# Patient Record
Sex: MALE | NOT HISPANIC OR LATINO
[De-identification: names, ages, dates, MRNs, and addresses within clinical notes are randomized per-mention and may not be internally consistent; named-entity substitution may affect disease eponyms.]

---
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
## 2024-07-02 ENCOUNTER — WALK IN (OUTPATIENT)
Dept: URGENT CARE | Age: 19
End: 2024-07-02
Attending: EMERGENCY MEDICINE

## 2024-07-02 VITALS
TEMPERATURE: 98.1 F | SYSTOLIC BLOOD PRESSURE: 122 MMHG | OXYGEN SATURATION: 99 % | RESPIRATION RATE: 16 BRPM | HEART RATE: 82 BPM | DIASTOLIC BLOOD PRESSURE: 85 MMHG | WEIGHT: 195 LBS

## 2024-07-02 DIAGNOSIS — S39.012A STRAIN OF LUMBAR REGION, INITIAL ENCOUNTER: Primary | ICD-10-CM

## 2024-07-02 RX ORDER — MESALAMINE 1.2 G/1
TABLET, DELAYED RELEASE ORAL
COMMUNITY
End: 2024-07-02 | Stop reason: CLARIF

## 2024-07-02 RX ORDER — MELOXICAM 7.5 MG/1
7.5 TABLET ORAL DAILY
Qty: 7 TABLET | Refills: 0 | Status: SHIPPED | OUTPATIENT
Start: 2024-07-02 | End: 2024-07-09

## 2025-05-19 ENCOUNTER — HOSPITAL ENCOUNTER (OUTPATIENT)
Age: 20
Discharge: HOME | End: 2025-05-19
Payer: COMMERCIAL

## 2025-05-19 VITALS
OXYGEN SATURATION: 97 % | TEMPERATURE: 98 F | HEART RATE: 88 BPM | RESPIRATION RATE: 20 BRPM | SYSTOLIC BLOOD PRESSURE: 111 MMHG | DIASTOLIC BLOOD PRESSURE: 80 MMHG

## 2025-05-19 DIAGNOSIS — J03.90 ACUTE TONSILLITIS, UNSPECIFIED ETIOLOGY: Primary | ICD-10-CM

## 2025-05-19 LAB — S PYO AG THROAT QL: NEGATIVE

## 2025-05-19 PROCEDURE — 87880 STREP A ASSAY W/OPTIC: CPT | Performed by: NURSE PRACTITIONER

## 2025-05-19 PROCEDURE — 99214 OFFICE O/P EST MOD 30 MIN: CPT | Performed by: NURSE PRACTITIONER

## 2025-05-19 RX ORDER — AMOXICILLIN 875 MG/1
875 TABLET, COATED ORAL 2 TIMES DAILY
Qty: 20 TABLET | Refills: 0 | Status: SHIPPED | OUTPATIENT
Start: 2025-05-19 | End: 2025-05-21 | Stop reason: ALTCHOICE

## 2025-05-19 NOTE — ED PROVIDER NOTES
Patient Seen in: [facility]mediate [facility]      History     Chief Complaint   Patient presents with    Cough/URI     Stated Complaint: sore throat, runny nose    Subjective:   HPI  History of Present Illness              19-year-old male presents with sore throat painful swallowing mild runny nose for the last 2 days.  No vomiting.  No fever.  No rash.    Objective:     Past Medical History:    Abdominal pain    Belching    Blood in the stool    Bronchitis    Colitis    ulcerative    Constipation    Diarrhea, unspecified    Esophageal reflux    Flatulence/gas pain/belching    Heartburn    Irregular bowel habits    Problems with swallowing    Sleep disturbance    Strep throat    Ulcerative colitis (HCC)    Visual impairment    glasses for reading    Weight loss              Past Surgical History:   Procedure Laterality Date    Colonoscopy N/A 7/10/2019    Procedure: COLONOSCOPY;  Surgeon: Bienvenido Koroma MD;  Location:  ENDOSCOPY    Colonoscopy N/A 6/10/2020    Procedure: COLONOSCOPY;  Surgeon: Bienvenido Koroma MD;  Location:  ENDOSCOPY    Egd      Myringoplasty Bilateral     Other Left 01/2018    wrist surgery    Other surgical history      tubes to ears                Social History     Socioeconomic History    Marital status: Single   Tobacco Use    Smoking status: Never    Smokeless tobacco: Never   Vaping Use    Vaping status: Never Used   Substance and Sexual Activity    Alcohol use: Never     Alcohol/week: 0.0 standard drinks of alcohol    Drug use: Never              Review of Systems    Positive for stated complaint: sore throat, runny nose, cough  Other systems are as noted in HPI.  Constitutional and vital signs reviewed.      All other systems reviewed and negative except as noted above.                  Physical Exam     ED Triage Vitals [05/19/25 1030]   /80   Pulse 88   Resp 20   Temp 98 °F (36.7 °C)   Temp src Oral   SpO2 97 %   O2 Device None (Room air)       Current Vitals:   Vital  Signs  BP: 111/80  Pulse: 88  Resp: 20  Temp: 98 °F (36.7 °C)  Temp src: Oral    Oxygen Therapy  SpO2: 97 %  O2 Device: None (Room air)          Physical Exam  Vitals and nursing note reviewed.   Constitutional:       Appearance: Normal appearance. He is ill-appearing.   HENT:      Right Ear: Tympanic membrane normal.      Left Ear: Tympanic membrane normal.      Nose: Rhinorrhea present.      Mouth/Throat:      Pharynx: Oropharyngeal exudate (Diffuse) and posterior oropharyngeal erythema present.      Tonsils: Tonsillar exudate present. No tonsillar abscesses.      Comments: Scattered exudate to the posterior pharynx uvula  Neck:      Comments: There is no posterior lymphadenopathy appreciated left anterior  Cardiovascular:      Rate and Rhythm: Normal rate and regular rhythm.      Pulses: Normal pulses.      Heart sounds: Normal heart sounds.   Musculoskeletal:      Cervical back: Normal range of motion and neck supple. No rigidity or tenderness.   Neurological:      Mental Status: He is alert.                   ED Course     Labs Reviewed   POCT RAPID STREP - Normal          Results                                MDM      19-year-old male presents with painful swallowing sore throat x 2 days strep considered as well as URI PTA COVID there is diffuse exudate and swelling will treat for tonsillitis strep was negative culture is pending  Amoxil twice daily x 10 days close PMD follow-up strict return precautions were discussed patient will add probiotic        Medical Decision Making  Problems Addressed:  Acute tonsillitis, unspecified etiology: acute illness or injury with systemic symptoms that poses a threat to life or bodily functions    Risk  OTC drugs.  Prescription drug management.          Disposition and Plan     Clinical Impression:  1. Acute tonsillitis, unspecified etiology         Disposition:  Discharge  5/19/2025 11:26 am    Follow-up:  Dionicio Almazan MD  [address]  [facility] [address]  [facility]  [address]  [phone]    Schedule an appointment as soon as possible for a visit   If symptoms worsen          Medications Prescribed:  Discharge Medication List as of 5/19/2025 11:26 AM        START taking these medications    Details   amoxicillin 875 MG Oral Tab Take 1 tablet (875 mg total) by mouth 2 (two) times daily for 10 days., Normal, Disp-20 tablet, R-0                   Supplementary Documentation:

## 2025-05-19 NOTE — DISCHARGE INSTRUCTIONS
Tylenol/ibuprofen as needed for pain and fever  Supportive care: Run humidifier, increase fluids, elevate head of bed  Ibuprofen  frequent nasal clearing, saline spray/steam showers.   Follow-up with your doctor as needed return for any new or worsening symptoms at any time

## 2025-05-21 ENCOUNTER — HOSPITAL ENCOUNTER (OUTPATIENT)
Age: 20
Discharge: HOME | End: 2025-05-21
Payer: COMMERCIAL

## 2025-05-21 VITALS
RESPIRATION RATE: 22 BRPM | TEMPERATURE: 99 F | SYSTOLIC BLOOD PRESSURE: 136 MMHG | DIASTOLIC BLOOD PRESSURE: 75 MMHG | OXYGEN SATURATION: 97 % | HEART RATE: 88 BPM

## 2025-05-21 DIAGNOSIS — J02.9 ACUTE SORE THROAT: Primary | ICD-10-CM

## 2025-05-21 LAB — POCT MONO: NEGATIVE

## 2025-05-21 PROCEDURE — 87591 N.GONORRHOEAE DNA AMP PROB: CPT | Performed by: NURSE PRACTITIONER

## 2025-05-21 PROCEDURE — 86308 HETEROPHILE ANTIBODY SCREEN: CPT | Performed by: NURSE PRACTITIONER

## 2025-05-21 PROCEDURE — 87491 CHLMYD TRACH DNA AMP PROBE: CPT | Performed by: NURSE PRACTITIONER

## 2025-05-21 PROCEDURE — 99214 OFFICE O/P EST MOD 30 MIN: CPT | Performed by: NURSE PRACTITIONER

## 2025-05-21 RX ORDER — DEXAMETHASONE SODIUM PHOSPHATE 10 MG/ML
10 INJECTION, SOLUTION INTRAMUSCULAR; INTRAVENOUS ONCE
Status: COMPLETED | OUTPATIENT
Start: 2025-05-21 | End: 2025-05-21

## 2025-05-21 NOTE — DISCHARGE INSTRUCTIONS
Stop amoxicillin, start Augmentin.  Will contact you if throat swab is positive.  Follow closely with your primary.  ER precautions as discussed.

## 2025-05-21 NOTE — ED PROVIDER NOTES
Patient Seen in: [facility]mediate [facility]        History  No chief complaint on file.    Stated Complaint: Sore throat    Subjective:   This is a 19-year-old male with history of ulcerative colitis.  Presents to immediate care for continued sore throat.  He was seen here 2 days ago and placed on a course of amoxicillin for presumptive strep positive tonsillitis.  Reports symptoms have not improved.  No voice change or stridor.  No fevers.  States he does feel fatigued.  Taking amoxicillin as prescribed.  No fevers.  No other treatment attempted prior to arrival.     The history is provided by the patient.                     Objective:     Past Medical History:    Abdominal pain    Belching    Blood in the stool    Bronchitis    Colitis    ulcerative    Constipation    Diarrhea, unspecified    Esophageal reflux    Flatulence/gas pain/belching    Heartburn    Irregular bowel habits    Problems with swallowing    Sleep disturbance    Strep throat    Ulcerative colitis (HCC)    Visual impairment    glasses for reading    Weight loss              Past Surgical History:   Procedure Laterality Date    Colonoscopy N/A 7/10/2019    Procedure: COLONOSCOPY;  Surgeon: Bienvenido Koroma MD;  Location:  ENDOSCOPY    Colonoscopy N/A 6/10/2020    Procedure: COLONOSCOPY;  Surgeon: Bienvenido Koroma MD;  Location:  ENDOSCOPY    Egd      Myringoplasty Bilateral     Other Left 01/2018    wrist surgery    Other surgical history      tubes to ears                Social History     Socioeconomic History    Marital status: Single   Tobacco Use    Smoking status: Never    Smokeless tobacco: Never   Vaping Use    Vaping status: Never Used   Substance and Sexual Activity    Alcohol use: Never     Alcohol/week: 0.0 standard drinks of alcohol    Drug use: Never              Review of Systems   Constitutional:  Negative for chills and fever.   HENT:  Positive for sore throat and trouble swallowing. Negative for congestion, ear  discharge, ear pain and voice change.    Respiratory:  Negative for cough, shortness of breath and wheezing.    Cardiovascular:  Negative for chest pain, palpitations and leg swelling.   Gastrointestinal:  Negative for abdominal pain, constipation, diarrhea, nausea and vomiting.   Genitourinary:  Negative for dysuria.   Musculoskeletal:  Negative for back pain, neck pain and neck stiffness.   Skin:  Negative for rash.   Neurological:  Negative for headaches.       Positive for stated complaint: Sore throat  Other systems are as noted in HPI.  Constitutional and vital signs reviewed.      All other systems reviewed and negative except as noted above.                  Physical Exam    ED Triage Vitals [05/21/25 1206]   /75   Pulse 88   Resp 22   Temp 98.5 °F (36.9 °C)   Temp src Oral   SpO2 97 %   O2 Device None (Room air)       Current Vitals:   Vital Signs  BP: 136/75  Pulse: 88  Resp: 22  Temp: 98.5 °F (36.9 °C)  Temp src: Oral    Oxygen Therapy  SpO2: 97 %  O2 Device: None (Room air)            Physical Exam  Vitals and nursing note reviewed.   Constitutional:       General: He is not in acute distress.     Appearance: Normal appearance. He is not ill-appearing, toxic-appearing or diaphoretic.   HENT:      Head: Normocephalic and atraumatic.      Right Ear: External ear normal.      Left Ear: External ear normal.      Nose: Nose normal.      Mouth/Throat:      Mouth: Mucous membranes are moist.      Pharynx: Uvula midline. Oropharyngeal exudate and posterior oropharyngeal erythema present. No pharyngeal swelling, uvula swelling or postnasal drip.      Tonsils: Tonsillar exudate present. No tonsillar abscesses. 1+ on the right. 1+ on the left.   Eyes:      General:         Right eye: No discharge.         Left eye: No discharge.      Extraocular Movements: Extraocular movements intact.      Conjunctiva/sclera: Conjunctivae normal.   Cardiovascular:      Rate and Rhythm: Normal rate.   Pulmonary:      Effort:  Pulmonary effort is normal.   Musculoskeletal:      Cervical back: Neck supple.      Right lower leg: No edema.      Left lower leg: No edema.   Skin:     General: Skin is warm and dry.      Capillary Refill: Capillary refill takes less than 2 seconds.      Findings: No rash.   Neurological:      Mental Status: He is alert and oriented to person, place, and time.   Psychiatric:         Mood and Affect: Mood normal.         Behavior: Behavior normal.                 ED Course  Labs Reviewed   POCT MONO TEST - Normal   CHLAMYDIA/GONOCOCCUS, PHARY          Rapid mono, dose of decadron                  MDM     Vital signs stable.  Patient is well-appearing and nontoxic looking.  Presents to immediate care for localized sore throat with tonsillar swelling.      Differential diagnosis includes strep pharyngitis, tonsillitis, mono, gonorrhea/chlamydia, PTA    Posterior pharynx is erythemic and covered in exudates.  Bilateral tonsils swollen +1 also covered in exudates.  No evidence of PTA.  Uvula is not swollen and midline.      Patient had a rapid strep 2 days ago and was negative    Mono is negative.    Gonorrhea/chlamydia throat swab obtained, sent out, and pending.    Dose of Decadron given here in clinic.    DC home.  Stop amoxicillin, start Augmentin.  Recommended warm salt water gargles and throat lozenges.  Tylenol and ibuprofen for pain.  Follow-up with PMD in 1 week.  Reasons to seek emergent care reinforced.  Patient verbalized understanding, and agreed with plan of care.  All questions answered.          Medical Decision Making      Disposition and Plan     Clinical Impression:  1. Acute sore throat         Disposition:  Discharge  5/21/2025 12:38 pm    Follow-up:  Dionicio Almazan MD  [address] [address]  [phone]    In 1 week            Medications Prescribed:  Current Discharge Medication List        START taking these medications    Details   amoxicillin clavulanate  875-125 MG Oral Tab Take 1 tablet by mouth 2 (two) times daily for 10 days.  Qty: 20 tablet, Refills: 0                   Supplementary Documentation:

## 2025-05-21 NOTE — ED INITIAL ASSESSMENT (HPI)
Pt has had a sore throat foe about 3 days and was placed on amoxicillin, strep neg,sore throat continues

## 2025-05-22 LAB
C. TRACHOMATIS, NAA, PHARYN: NEGATIVE
N. GONORRHOEAE, NAA, PHARYN: NEGATIVE

## 2025-08-08 ENCOUNTER — HOSPITAL ENCOUNTER (OUTPATIENT)
Age: 20
Discharge: HOME | End: 2025-08-08

## 2025-08-08 VITALS
HEIGHT: 70 IN | SYSTOLIC BLOOD PRESSURE: 130 MMHG | BODY MASS INDEX: 30.06 KG/M2 | RESPIRATION RATE: 18 BRPM | HEART RATE: 67 BPM | DIASTOLIC BLOOD PRESSURE: 90 MMHG | TEMPERATURE: 98 F | WEIGHT: 210 LBS | OXYGEN SATURATION: 97 %

## 2025-08-08 DIAGNOSIS — H10.31 ACUTE CONJUNCTIVITIS OF RIGHT EYE, UNSPECIFIED ACUTE CONJUNCTIVITIS TYPE: Primary | ICD-10-CM

## 2025-08-08 PROCEDURE — 99213 OFFICE O/P EST LOW 20 MIN: CPT | Performed by: PHYSICIAN ASSISTANT

## 2025-08-08 RX ORDER — POLYMYXIN B SULFATE AND TRIMETHOPRIM 1; 10000 MG/ML; [USP'U]/ML
1 SOLUTION OPHTHALMIC 4 TIMES DAILY
Qty: 1 EACH | Refills: 0 | Status: SHIPPED | OUTPATIENT
Start: 2025-08-08 | End: 2025-08-15

## (undated) DEVICE — MEDI-VAC NON-CONDUCTIVE SUCTION TUBING: Brand: CARDINAL HEALTH

## (undated) DEVICE — FORCEP BIOPSY RJ4 LG CAP W/ND

## (undated) DEVICE — FILTERLINE NASAL ADULT O2/CO2

## (undated) DEVICE — ENDOSCOPY PACK UPPER: Brand: MEDLINE INDUSTRIES, INC.

## (undated) DEVICE — ENDOSCOPY PACK - LOWER: Brand: MEDLINE INDUSTRIES, INC.

## (undated) DEVICE — 3M™ RED DOT™ MONITORING ELECTRODE WITH FOAM TAPE AND STICKY GEL, 50/BAG, 20/CASE, 72/PLT 2570: Brand: RED DOT™

## (undated) DEVICE — Device: Brand: DEFENDO AIR/WATER/SUCTION AND BIOPSY VALVE

## (undated) DEVICE — 1200CC GUARDIAN II: Brand: GUARDIAN

## (undated) NOTE — LETTER
Date & Time: 9/16/2019, 5:48 PM  Patient: Diana Forbes  Encounter Provider(s):    Krzysztof Sanchez MD       To Whom It May Concern:    Diana Forbes was seen and treated in our department on 9/16/2019.  He should not participate in gym

## (undated) NOTE — LETTER
[facility] CARE IN [place]  [address] [address]  Dept: [phone]  Dept Fax: [phone]      February 20, 2018    Patient: Dali Garza   Date of Visit: 2/20/2018       To Whom It May Concern:    Prateek Gunn

## (undated) NOTE — LETTER
Date & Time: 4/25/2019, 9:10 AM  Patient: Jesusita Chavarria  Encounter Provider(s):    Moises Leblanc MD       To Whom It May Concern:    Jesusita Chavarria was seen and treated in our department on 4/25/2019.  He may return to school 4/26/201

## (undated) NOTE — ED AVS SNAPSHOT
Petey Carter   MRN: [id number]    Department:  [facility] Emergency Department   Date of Visit:  4/25/2019           Disclosure     Insurance plans vary and the physician(s) referred by the ER may not be covered by your plan.  Please cont tell this physician (or your personal doctor if your instructions are to return to your personal doctor) about any new or lasting problems. The primary care or specialist physician will see patients referred from the [facility] Emergency Department.  Salvadore Skiff

## (undated) NOTE — LETTER
Date & Time: 8/13/2022, 10:50 AM  Patient: Chris Love  Encounter Provider(s):    See, Marilin Hernandez PA-C       To Whom It May Concern:    Rosa Wilson was seen and treated in our department on 8/13/2022. He can return to work on 8/16/2022.     If you have any questions or concerns, please do not hesitate to call.        _____________________________  Physician/APC Signature

## (undated) NOTE — LETTER
Date & Time: 1/14/2019, 5:44 PM  Patient: Tha Gotti  Encounter Provider(s):    MERCEDEZ He       To Whom It May Concern:    Tha Gotti was seen and treated in our department on 1/14/2019.   Patient may return to school on T

## (undated) NOTE — LETTER
Lisa Alonso [address]  [facility], [address] [facility]    Consent for Operation  Date: __________________                                Time: _______________    1.  I authorize the performance upon Rolly Plascencia the following operation:  Pr procedure has been videotaped, the surgeon will obtain the original videotape. The hospital will not be responsible for storage or maintenance of this tape.   7. For the purpose of advancing medical education, I consent to the admittance of observers to the STATEMENTS REQUIRING INSERTION OR COMPLETION WERE FILLED IN.     Signature of Patient:   ___________________________    When the patient is a minor or mentally incompetent to give consent:  Signature of person authorized to consent for patient: ____________ supplements, and pills I can buy without a prescription (including street drugs/illegal medications). Failure to inform my anesthesiologist about these medicines may increase my risk of anesthetic complications. iv.  If I am allergic to anything or have ha Anesthesiologist Signature     Date   Time  I have discussed the procedure and information above with the patient (or patient’s representative) and answered their questions. The patient or their representative has agreed to have anesthesia services.     ___

## (undated) NOTE — ED AVS SNAPSHOT
Omar Speaker   MRN: [id number]    Department:  [facility] Emergency Department   Date of Visit:  8/15/2019           Disclosure     Insurance plans vary and the physician(s) referred by the ER may not be covered by your plan.  Please cont tell this physician (or your personal doctor if your instructions are to return to your personal doctor) about any new or lasting problems. The primary care or specialist physician will see patients referred from the [facility] Emergency Department.  Brina Vee

## (undated) NOTE — LETTER
[facility] IN [place] [id number] [facility] [address]  Dept: [phone]  Dept Fax: [phone]      November 13, 2017    Patient: Oscar Hoffman   Date of Visit: 11/13/2017       To Whom It May Concern:    Esha Brooks

## (undated) NOTE — LETTER
Date & Time: 10/15/2019, 11:20 AM  Patient: Hero Moore  Encounter Provider(s):    Nohelia Benson MD       To Whom It May Concern:    Hero Moore was seen and treated in our department on 10/15/2019. He can return to school.     If y

## (undated) NOTE — ED AVS SNAPSHOT
Parent/Legal Guardian Access to the Online [employer] Record of a Patient 15to 16Years Old  Return completed form by Secure email to [place] HIM/Medical Records Department: [email or web address]     Requirements and Procedures   Under [facility] ·  I understand that [address] is intended as a secure online source of confidential medical information.  If I share my MyChart ID and password with another person, that person may be able to view my or my child’s health information, and health information a · This form does not substitute as an Authorization to Release health information to a designated proxy by any other method. The purpose of this Minor Proxy form is for access to the [employer] portal information.     By signing below, I acknowledge that I ha I have read and understand the requirements and procedures for accessing my child’s medical record information online as provided  on page one of this document titled, Parent/Legal Guardian Access to the Online MyChart Record of a Patient [address] to [address]

## (undated) NOTE — LETTER
[facility] CARE IN [place]  [address] [address]  Dept: [phone]  Dept Fax: [phone]      November 27, 2017    Patient: Tha Gotti   Date of Visit: 11/27/2017       To Whom It May Concern:    Neomi Grove

## (undated) NOTE — LETTER
Date & Time: 7/7/2022, 4:01 PM  Patient: Ralph Love  Encounter Provider(s):    Pinky Baez PA-C       To Whom It May Concern:    Franco Lau was seen and treated in our department on 7/7/2022. He should not return to work until fever free for 24 hours.   If you have any questions or concerns, please do not hesitate to call.        _____________________________  Physician/APC Signature

## (undated) NOTE — ED AVS SNAPSHOT
Parent/Legal Guardian Access to the Online [employer] Record of a Patient 15to 16Years Old  Return completed form by Secure email to [place] HIM/Medical Records Department: [email or web address].     Requirements and Procedures   Under [facility] ·  I understand that [employer] is intended as a secure online source of confidential medical information.  If I share my MyChart ID and password with another person, that person may be able to view my or my child’s health information, and health information a · This form does not substitute as an Authorization to Release health information to a designated proxy by any other method. The purpose of this Minor Proxy form is for access to the [employer] portal information.     By signing below, I acknowledge that I ha I have read and understand the requirements and procedures for accessing my child’s medical record information online as provided  on page one of this document titled, Parent/Legal Guardian Access to the Online MyChart Record of a Patient [address] to [address]

## (undated) NOTE — LETTER
Date & Time: 11/17/2021, 1:48 PM  Patient: Nettie Swartz  Encounter Provider(s):    Nancy Javed PA-C       To Whom It May Concern:    Nettie Swartz was seen and treated in our department on 11/17/2021. He can return to school.   He is n

## (undated) NOTE — LETTER
Date & Time: 1/9/2020, 6:00 PM  Patient: Valentin Blanco  Encounter Provider(s):    Josh Barreto MD       To Whom It May Concern:    Valentin Blanco was seen and treated in our department on 1/9/2020.  He should not return to school until 1

## (undated) NOTE — LETTER
[facility] CARE IN [place]  [address] [address]  Dept: [phone]  Dept Fax: [phone]      January 23, 2018    Patient: Hero Moore   Date of Visit: 1/23/2018       To Whom It May Concern:    Hero Moore

## (undated) NOTE — LETTER
Date & Time: 4/25/2019, 9:11 AM  Patient: Lonny Marino  Encounter Provider(s):    Roger Patterson MD       To Whom It May Concern:    Lonny Marino was seen and treated in our department on 4/25/2019.  He may return to playing sports

## (undated) NOTE — ED AVS SNAPSHOT
Petey Carter   MRN: [id number]    Department:  [facility] Emergency Department   Date of Visit:  9/16/2019           Disclosure     Insurance plans vary and the physician(s) referred by the ER may not be covered by your plan.  Please cont tell this physician (or your personal doctor if your instructions are to return to your personal doctor) about any new or lasting problems. The primary care or specialist physician will see patients referred from the [facility] Emergency Department.  Dimitris Sun